# Patient Record
Sex: FEMALE | Race: WHITE | URBAN - METROPOLITAN AREA
[De-identification: names, ages, dates, MRNs, and addresses within clinical notes are randomized per-mention and may not be internally consistent; named-entity substitution may affect disease eponyms.]

---
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
## 2025-02-10 ENCOUNTER — OFFICE VISIT (OUTPATIENT)
Dept: ORTHOPEDICS | Facility: CLINIC | Age: 20
End: 2025-02-10
Payer: COMMERCIAL

## 2025-02-10 VITALS
BODY MASS INDEX: 34.55 KG/M2 | DIASTOLIC BLOOD PRESSURE: 81 MMHG | WEIGHT: 202.38 LBS | SYSTOLIC BLOOD PRESSURE: 119 MMHG | HEIGHT: 64 IN | HEART RATE: 72 BPM | OXYGEN SATURATION: 100 %

## 2025-02-10 DIAGNOSIS — M24.419 RECURRENT SUBLUXATION OF SHOULDER WITH MULTIDIRECTIONAL INSTABILITY: Primary | ICD-10-CM

## 2025-02-10 PROCEDURE — 1159F MED LIST DOCD IN RCRD: CPT | Mod: CPTII,,, | Performed by: SPECIALIST

## 2025-02-10 PROCEDURE — 99213 OFFICE O/P EST LOW 20 MIN: CPT | Mod: S$PBB,,, | Performed by: SPECIALIST

## 2025-02-10 PROCEDURE — 3008F BODY MASS INDEX DOCD: CPT | Mod: CPTII,,, | Performed by: SPECIALIST

## 2025-02-10 PROCEDURE — 3079F DIAST BP 80-89 MM HG: CPT | Mod: CPTII,,, | Performed by: SPECIALIST

## 2025-02-10 PROCEDURE — 99213 OFFICE O/P EST LOW 20 MIN: CPT | Mod: PBBFAC

## 2025-02-10 PROCEDURE — 3074F SYST BP LT 130 MM HG: CPT | Mod: CPTII,,, | Performed by: SPECIALIST

## 2025-02-10 NOTE — PROGRESS NOTES
Faculty Attestation: Andree Meredith  was seen at [facility] in the Orthopaedic Clinic. Patient seen and evaluated at the time of the visit. History of Present Illness, Physical Exam, and Assessment and Plan reviewed. Treatment plan is reasonable and appropriate. Compliance with treatment recommendations is important. No procedure was performed.  Patient is complaints and examination upon my personal history taking and clinical examination are consistent with multidirectional instability.  She has a 2 to 3+ posterior and anterior glenoid load and shift as well as a positive sulcus sign.  She has never had a jeevan dislocation by history and has never had an emergency room visit for dislocation.  She has laxity on exam with minimal apprehension of shoulder external rotation of 120° while abducted at 90°.  She has more apprehension during jerk test.  She does have a positive apprehension sign anteriorly with a positive relocation sign.  We will send her to physical therapy and have her follow up for an evaluation with Dr. Oliva.    Seven Jensen MD  Orthopaedic Surgery

## 2025-02-10 NOTE — PROGRESS NOTES
\[facility]\"" Orthopaedic Surgery Clinic Note      HPI:  19 y.o. female presents to the clinic today for evaluation of right shoulder pain and instability.  She returns today for follow-up of her MRI.  She continues to note instability feelings in the shoulder.  She has not ever had to go to the ER to have it reduced.  She says that she feels that it moves all around.  It bothers her at night.    PMH:   History reviewed. No pertinent past medical history.    PSH:    has no past surgical history on file.    SOCIAL:    reports that she has never smoked. She has never used smokeless tobacco. She reports that she does not drink alcohol and does not use drugs.    FAMILY:  Family History   Problem Relation Name Age of Onset    Depression Mother Adriana     Vision loss Mother Adriana     Diabetes Father Yan     Cancer Paternal Grandfather Dads dad     Depression Sister Gissel     Depression Maternal Aunt Genigor        MEDS:   Current Outpatient Medications on File Prior to Visit   Medication Sig Dispense Refill    diclofenac sodium (VOLTAREN ARTHRITIS PAIN) 1 % Gel Apply 2 g topically 4 (four) times daily. Do not exceed 32 grams/day: do not to exceed 8 grams/day/single joint of upper extremities; do not to exceed 16 grams/day/single joint of lower extremities.  Please request refill of this medication from your PCP. 100 g 3    sertraline (ZOLOFT) 50 MG tablet Take 1 tablet by mouth every morning.       No current facility-administered medications on file prior to visit.       ALLERGY:   Allergies as of 02/10/2025    (No Known Allergies)       Vitals:    Vitals:    02/10/25 0733   BP: 119/81   Pulse: 72       Physical Exam:    Gen: A/Ox3, NAD  Card: RR by RP  Lungs: nonlabored breathing, symmetric chest rise  Abd: S/NT/ND    Right shoulder     No open wounds or skin lesions   No tenderness directly over the AC joint, no tenderness laterally or posteriorly, very mild tenderness anteriorly   Range of motion 180/180/70/T4   Rotator cuff  strength 5/5   negative cross-body adduction  Positive apprehension and relocation test   Able to move her shoulder anteriorly and posterior to the room but unable to dislocate with shift and load  Positive jerk test   Negative speed, Yergason's   Motor intact ain/pin/U   Sensation intact to light touch/U/R   Well-perfused distally    Radiology:  Impression:     The rotator cuff and labrum are nicely visualized and appear intact     The axillary recess is patulous consistent with capsular laxity.  The middle and inferior glenohumeral ligaments are disorganized consistent with partial-thickness tearing which may not be acute.  The subscapularis muscle belly is partially stripped off of the anterior scapula.  Taken together these findings are typically associated with anterior dislocation.    Assessment/Plan:  19 y.o. female with recurrent instability of the right shoulder concerning for multidirectional instability, possible partial subscap tear    We discussed continued nonoperative versus operative treatment.  I do think she has multidirectional instability based on her exam, clinical findings, and MRI.  We will try another round of physical therapy to see if we can get her some relief from her symptoms.  We will bring her back with Dr. Oliva is here after her therapy.  If she continues to have symptoms we will discuss with him whether he thinks there is a good surgical option.          Fernando Perez MD

## 2025-05-05 ENCOUNTER — OFFICE VISIT (OUTPATIENT)
Dept: ORTHOPEDICS | Facility: CLINIC | Age: 20
End: 2025-05-05
Payer: COMMERCIAL

## 2025-05-05 VITALS
HEIGHT: 64 IN | TEMPERATURE: 98 F | BODY MASS INDEX: 34.49 KG/M2 | SYSTOLIC BLOOD PRESSURE: 132 MMHG | WEIGHT: 202 LBS | DIASTOLIC BLOOD PRESSURE: 80 MMHG

## 2025-05-05 DIAGNOSIS — S43.431S BANKART LESION OF RIGHT SHOULDER, SEQUELA: Primary | ICD-10-CM

## 2025-05-05 PROBLEM — S43.431A BANKART LESION OF RIGHT SHOULDER: Status: ACTIVE | Noted: 2025-05-05

## 2025-05-05 PROCEDURE — 99214 OFFICE O/P EST MOD 30 MIN: CPT | Mod: PBBFAC

## 2025-05-05 PROCEDURE — 1159F MED LIST DOCD IN RCRD: CPT | Mod: CPTII,,, | Performed by: ORTHOPAEDIC SURGERY

## 2025-05-05 PROCEDURE — 3075F SYST BP GE 130 - 139MM HG: CPT | Mod: CPTII,,, | Performed by: ORTHOPAEDIC SURGERY

## 2025-05-05 PROCEDURE — 3079F DIAST BP 80-89 MM HG: CPT | Mod: CPTII,,, | Performed by: ORTHOPAEDIC SURGERY

## 2025-05-05 PROCEDURE — 99214 OFFICE O/P EST MOD 30 MIN: CPT | Mod: S$PBB,GC,, | Performed by: ORTHOPAEDIC SURGERY

## 2025-05-05 PROCEDURE — 3008F BODY MASS INDEX DOCD: CPT | Mod: CPTII,,, | Performed by: ORTHOPAEDIC SURGERY

## 2025-05-05 RX ORDER — CEFAZOLIN SODIUM 2 G/50ML
2 SOLUTION INTRAVENOUS
OUTPATIENT
Start: 2025-05-05

## 2025-05-05 RX ORDER — MUPIROCIN 20 MG/G
OINTMENT TOPICAL
OUTPATIENT
Start: 2025-05-05

## 2025-05-05 NOTE — LETTER
[facility]  [address] [place] [address]  Phone: [phone]     How to Prepare for Surgery (Orthopedic)  About this topic   There are some things that are common for most kinds of surgery. These help to keep you safe. Learn what you can do to get ready for your surgery. This will help you and the team caring for you during your surgery. Also, learn about the things the doctor and nurses do to keep you safe during surgery.  You may have outpatient surgery where you come in the day of your surgery and then go home hours after your surgery.  In other cases, you may be admitted to the hospital the day before your surgery, or you may have to stay in the hospital after your surgery.  General   Weeks before your surgery:  These are things you can do to lower your chances of having problems after your surgery:  Stop smoking if you are a smoker. Avoid being around others who smoke for several weeks before and after your surgery. Smoking limits how much oxygen gets to your body for healing.  If you have diabetes, keep your blood sugars as near normal as you can. This helps lower your chance of infection or other problems after your surgery.  Talk to your doctor about all the drugs you take. This includes over-the-counter drugs, natural products, and vitamins.  There may be some you can take the day of your surgery. If you have diabetes, talk to your doctor about your drugs, especially if you are on insulin. Your doctor may tell you to take less than usual on the day of your surgery.  If you take insulin or any diabetic medication do not take the morning of surgery. __________________________________________________________________________________  Your doctor may want you to stop taking some of your drugs before surgery. Some drugs and natural products make it harder for your blood to clot. Then, you may have more bleeding during or after surgery. Be sure to tell your doctor if  you are taking any drugs that may cause bleeding.   Common Medication Perioperative Consideration Stop prior to surgery   Aspirin Risk for Bleeding 7 days   NSAID's (antiinflammatories) Examples: Mobic/Meloxicam  Advil/Motrin/Ibuprofen/Aleve  Naproixen/Naprosyn  Voltaren/Diclofenac/Arthotec  /Celebrex/Celecoxib  /Toradol/Ketorolac  Ralafen/Nabumetone/Arthotec Risk for Bleeding 7 days   Phentermine/Diet Pills  Anesthesia, BP, Cardiac Side Effects 7 days   GIP/Glp-1 Agonist  Examples: Trulicity (Dulaglutide) Ozempic/Wegovy (Semaglutide) Victoza/Saxenda (liraglutide) Mounjaro (Trizepatide) Delayed Gastric Emptying causing Increase risk of aspiration in surgery 14 days   Omega-3/Fish oil Increase risk for bleeding 7 days    Medication that needs to be stopped ____________________________________________________________________________________________________________________________________________________________________________________________  Herbal Supplements you should stop     Herbal Supplement Operative Concerns Stop before Surgery   Echinacea Can be associated with allergic reaction 7 days   Ephedra (Mahung) Increase blood pressure and heart rate with anesthesia 7 days   All Vitamins, CBD supplement, Garlic, Samreen, Tumeric,Umary Increase risk of bleeding 7 days   Gingko Biloba Increase risk of bleeding 7 days   Ginseng Low Glucose, decrease effects of coumadin 7 days   Kava Increased sedation with anesthesia  7 days   Alonzo Wort Multiple drug interaction with coumadin, steroids, and some heart medication 7 days   Valerian Increase sedation Taper dose  14 days before surgery   Phentermine Cardiac side effects 7 days   Fenfluramine (Phen Phen) Cardiac side effects 7 days      Have the tests done that your doctor orders before your surgery. Your doctor may want you to have lab tests, an x-ray, or EKG to see how healthy you are. Having these tests helps the doctor plan for your care during surgery.  Mild  exercise like walking, riding a bike, or swimming may be good for you. It may help you breathe more easily before and after surgery. Ask your doctor if it is OK for you to exercise before surgery. Also, practice taking deep breaths. Often after surgery, you will be asked to take deep breaths and cough. This may help prevent lung infections.  Follow a healthy diet and eat nutritious, well-balanced meals. Eat a healthy meal for supper the day before your surgery. Avoid beer, wine, and mixed drinks (alcohol).  You will not be allowed to drive right away after surgery. Ask a family member or a friend to drive you home.  Most often your doctor will want you to have an adult stay with you for at least 24 hours. Arrange with family or friends to stay with you on the first day after your surgery or when you go home.  If you get sick with a cold, infection, or other illness in the 2 weeks before your surgery, call your doctor's office. You may need to change when you have surgery because you may be more at risk for infection or other problems.  Check your skin for rashes, cuts, insect bites, or any skin infections and report these to your doctor before surgery. Pay attention to the areas that you cannot see easily, such as the bottoms of your feet and back. Check in skin folds for any bumps or skin rashes.  Shaving should be stopped at least 2 days before surgery on all areas of the body including the face, legs, underarms, etc.   Day before and morning of your surgery:  Wash your hair and take a bath or shower before your surgery (around 7 pm). The doctor may give you special soap or wipes to wash with before the surgery to lessen the germs on your skin. Follow the directions how to use this special soap or wipes provided by your doctor. Your skin should be completely dry and cool. When applied to sensitive skin, the cloths may irritate the skin such as temporary itching sensation and /or redness.  If itching or redness  persists, rinse affected area and discontinue use.  Clean skin in the following order using one cloth for each step.   AVOID CONTACT WITH EYES, EARS, MOUTH,AND MUCOUS MEMBRANES (VAGINAL OR RECTAL).                     YOU MUST USE ALL OF THE CLOTHS  Cloth #1  Wipe YOUR Neck and chest.  Cloth #2  Wipe both arms to fingertips and both armpits.  Cloth #3  Wipe both hips followed by groin area. Be sure to wipe the folds of your stomach and groin.  DO NOT use on vaginal and rectal areas.  Cloth #4  Wipe both legs starting at the thigh and ending at the toes.  Cloth #5  Wipe your back.  Cloth #6  Wipe your buttocks.  Do not use body lotions, perfumes, powders, or creams on your skin, especially near the surgical site. Do not wear makeup, especially eye makeup.  You will also need to take off nail polish and jewelry. Remove any jewelry from body piercings.  Stop eating and drinking at the time you are told to. This helps to make sure that your stomach is empty while you are under anesthesia.  Brush your teeth before your surgery. Take extra care not to drink any water while you brush. If your child is having surgery, watch that your child does not drink any water while they brush.  Leave all valuables and jewelry at home.  What to expect when you arrive for surgery:  At the hospital or surgery center, the staff will work to get you ready for your surgery. Here are some of the things that will happen before you get to the operating room:  You will have a bracelet that has your name, birth date, and other information on it. Staff may check this bracelet often or ask you your name and birthdate. This is a safety check to make sure they have the right patient.  If you have allergies, you may have to wear a bracelet with them listed on it. You may also have a bracelet to tell staff that you are at a higher risk of falling.  If you have a history of sleep apnea and use a CPAP machine for sleep, please bring the CPAP with you if  you are spending the night after your surgery.  You will change out of your clothing and wear a hospital gown. You will need to take off your glasses and remove contact lenses, hearing aids, and dentures before your surgery.  The staff will:  Give you warm blankets or use a warming blanket so you are not cold.  Take your temperature and blood pressure. They may also ask about or check your height and weight.  Ask questions about your health history and allergies. You may have to tell this information to others as well. This is another safety check.  Put an IV in your hand or arm to give you fluids and drugs. You may be given a drug to make you sleepy.  The staff may:  Give you a special mouthwash to use. This helps lower the number of germs in your mouth.  Put special stockings or boots on your legs and feet to help with blood flow.  Use clippers to remove hair around the area of your surgical cut, depending on the site of your surgery.  The anesthesia team will:  Ask about your history and allergies.  Ask you to sign a consent after they speak with you about their anesthesia plan for you.  Want to know if you have any loose teeth before your surgery.  Talk with you about your surgery and what they will do to keep you comfortable during surgery.  Have you meet people who will be in the operating room with you.  Your doctor will:  Talk with you about your surgery and the risks and benefits. Be sure to ask any questions you may have. You may need to sign a consent form if you have not already done so.  Talk with you about the possible need for blood products. You may be asked to sign a consent for blood products as well.  Sign or ben the part of your body where you will be having surgery. This is a safety check.    What will the results be?   You will be ready for your surgery.  What drugs may be needed?   The doctor may order drugs before your surgery to:  Help with fear or worry and help you to relax  Prevent  infection  Prevent blood clots  The doctor may order drugs after surgery to:  Help lessen pain  Help prevent or lessen upset stomach  Prevent infection  When do I need to call the doctor?   Signs of infection. These include a fever of 100.4°F (38°C) or higher, chills.  If you have a cough, cold, fever, or become ill a few days before you are scheduled to have surgery. Your doctor may want to reschedule it.  If you do not have a ride to and from your surgery  If you have any skin rashes, cuts, boils, or infections on your skin. Your doctor may want to reschedule the surgery since this can put you at risk for wound infection after your surgery.  Helpful tips   Wear loose clothing and comfortable shoes that are easy to put on and take off.  Remove all body piercings before you come for your surgery.  Bring these things with you to the hospital:  Your insurance card and photo ID  A copy of your advance directive if you have one  A list of all drugs that you take and the amounts that you take  A list of all your allergies and the kind of reaction they cause you  Make sure you will be able to move about your home easily after your surgery. You may need extra pillows or a recliner to rest in.  Have foods in your home that will be easy on your belly after surgery. You may want things like juices, crackers, soups, and Jello.

## 2025-05-05 NOTE — LETTER
May 5, 2025      [facility]  [address] [place] [id number]  Phone: [phone]       Patient: Andree Meredith   YOB: 2005  Date of Visit: 05/05/2025    To Whom It May Concern:    Brandi Meredith  was at [facility] on 05/05/2025. The patient may return to work/school on 05/06/2025 with no restrictions. If you have any questions or concerns, or if I can be of further assistance, please do not hesitate to contact me.    Sincerely,    Maryanne Hermosillo MA

## 2025-05-05 NOTE — PROGRESS NOTES
[facility] Orthopaedic Surgery Clinic Note      HPI:  19 y.o. female with chronic right anterior shoulder instability which she states has not gotten better despite multiple rounds of therapy and activity modification.  She continues to work as a student .  She plans to begin college in the fall.  She is interested in surgical intervention for this problem.    PMH:   History reviewed. No pertinent past medical history.    PSH:    has no past surgical history on file.    SOCIAL:    reports that she has never smoked. She has never used smokeless tobacco. She reports that she does not drink alcohol and does not use drugs.    FAMILY:  Family History   Problem Relation Name Age of Onset    Depression Mother Adriana     Vision loss Mother Adriana     Diabetes Father Yan     Cancer Paternal Grandfather Dads dad     Depression Sister Gissel     Depression Maternal Aunt igor        MEDS:   Current Outpatient Medications on File Prior to Visit   Medication Sig Dispense Refill    diclofenac sodium (VOLTAREN ARTHRITIS PAIN) 1 % Gel Apply 2 g topically 4 (four) times daily. Do not exceed 32 grams/day: do not to exceed 8 grams/day/single joint of upper extremities; do not to exceed 16 grams/day/single joint of lower extremities.  Please request refill of this medication from your PCP. 100 g 3    sertraline (ZOLOFT) 50 MG tablet Take 1 tablet by mouth every morning.       No current facility-administered medications on file prior to visit.       ALLERGY:   Allergies as of 05/05/2025    (No Known Allergies)       Vitals:    Vitals:    05/05/25 1304   BP: 132/80   Temp: 97.6 °F (36.4 °C)       Physical Exam:    Gen: A/Ox3, NAD  Card: RR by RP      Right shoulder     No open wounds or skin lesions   No tenderness directly over the AC joint, no tenderness laterally or posteriorly, very mild tenderness anteriorly   Range of motion 180/180/70/T4   Rotator cuff strength 5/5   negative cross-body adduction  Positive apprehension  and relocation test   Negative speed, Yergason's   Motor intact ain/pin/U   Sensation intact to light touch/U/R   Well-perfused distally    Imaging:  MRI right shoulder demonstrates middle and inferior glenohumeral ligament incompetence, partial anterior labral sleeve avulsion, partial undersurface subscapularis tear    Assessment/Plan:  19 y.o. female with recurrent instability of the right shoulder concerning for multidirectional instability, possible partial subscap tear    -discussed the r/b/a to right shoulder arthroscopic labral repair, possible rotator cuff repair, debridement; we particularly emphasized that the posterior instability she occasionally experiences would not improve with this type of surgery although the majority of her symptoms she expressed our anterior, she would like to move forward with surgery and expressed verbal understanding of all this, informed consent was obtained  -case request an orders placed for 05/27/2025          Caleb Turner MD

## 2025-05-06 NOTE — PROGRESS NOTES
Faculty Attestation: Andree Meredith  was seen at [facility] in the Orthopaedic Clinic in the outpatient department at a [facility]. Patient seen and evaluated at the time of the visit.  I participated in the management of the patient and was immediately available throughout the encounter. History of Present Illness, Physical Exam, and Assessment and Plan reviewed. Treatment plan is reasonable and appropriate. Compliance with treatment recommendations is important. No procedure was performed.   By my exam I was able to appreciate some posterior subluxation. No sulcus sign and anterior apprehension.    Haresh Flowers MD  Orthopedic Surgery Chief

## 2025-05-26 ENCOUNTER — ANESTHESIA EVENT (OUTPATIENT)
Dept: SURGERY | Facility: HOSPITAL | Age: 20
End: 2025-05-26
Payer: COMMERCIAL

## 2025-05-26 NOTE — ANESTHESIA PREPROCEDURE EVALUATION
05/26/2025  Andree Meredith is a 19 y.o., female with PMHx of obesity, depression presents for Rt shoulder ATS.    NO BETA BLOCKER USE    Active Ambulatory Problems     Diagnosis Date Noted    Bankart lesion of right shoulder 05/05/2025     Resolved Ambulatory Problems     Diagnosis Date Noted    No Resolved Ambulatory Problems     No Additional Past Medical History       Pre-op Assessment    I have reviewed the NPO Status.      Review of Systems  Anesthesia Hx:  No previous Anesthesia                Social:  Non-Smoker       Cardiovascular:  Cardiovascular Normal                                              Pulmonary:  Pulmonary Normal                       Renal/:  Renal/ Normal                 Hepatic/GI:  Hepatic/GI Normal                    Neurological:  Neurology Normal                                      Endocrine:        Obesity / BMI > 30  Psych:    depression              Vitals:    05/27/25 0515 05/27/25 0515 05/27/25 0519 05/27/25 0627   BP:  121/84  120/77   Pulse:  91  82   Resp:   20 20   Temp:  36.7 °C (98 °F)  36.3 °C (97.3 °F)   TempSrc:  Oral  Temporal   SpO2:  96%  100%   Weight: 91.8 kg (202 lb 6.4 oz)            Physical Exam  General: Alert, Cooperative and Well nourished    Airway:  Mallampati: II   Mouth Opening: Normal  TM Distance: Normal  Tongue: Normal  Neck ROM: Normal ROM    Dental:  Intact    Chest/Lungs:  Normal Respiratory Rate, Clear to auscultation    Heart:  Rate: Normal  Rhythm: Regular Rhythm  Sounds: Normal      Lab Results   Component Value Date    WBC 6.9 11/10/2020    HGB 14.2 11/10/2020    HCT 41.9 11/10/2020    MCV 97.2 (H) 11/10/2020     11/10/2020       CMP  Sodium   Date Value Ref Range Status   11/10/2020 142 133 - 143 mmol/L Final     Potassium   Date Value Ref Range Status   11/10/2020 4.4 3.5 - 5.1 mmol/L Final     Chloride   Date Value Ref Range  Status   11/10/2020 104 98 - 115 mmol/L Final     CO2   Date Value Ref Range Status   11/10/2020 30.1 (H) 17.0 - 30.0 mmol/L Final     Glucose   Date Value Ref Range Status   11/10/2020 88 74 - 106 mg/dL Final     Blood Urea Nitrogen   Date Value Ref Range Status   11/10/2020 13.6 7.0 - 22.0 mg/dL Final     Creatinine   Date Value Ref Range Status   11/10/2020 0.59 0.30 - 1.30 mg/dL Final     Calcium   Date Value Ref Range Status   11/10/2020 9.7 8.5 - 10.1 mg/dL Final     Protein Total   Date Value Ref Range Status   11/10/2020 7.7 6.1 - 8.0 gm/dL Final     Albumin   Date Value Ref Range Status   11/10/2020 4.23 3.10 - 4.80 gm/dL Final     Bilirubin Total   Date Value Ref Range Status   11/10/2020 0.5 0.0 - 1.9 mg/dL Final     ALP   Date Value Ref Range Status   11/10/2020 118 (H) 46 - 116 unit/L Final     AST   Date Value Ref Range Status   11/10/2020 18 14 - 37 unit/L Final     ALT   Date Value Ref Range Status   11/10/2020 15 12 - 78 unit/L Final      Latest Reference Range & Units 05/27/25 05:26   hCG Qualitative, Urine Negative  Negative       Anesthesia Plan  Type of Anesthesia, risks & benefits discussed:    Anesthesia Type: Gen ETT, Regional  Intra-op Monitoring Plan: Standard ASA Monitors  Post Op Pain Control Plan: IV/PO Opioids PRN  Induction:  IV  Airway Plan: Direct  Informed Consent: Informed consent signed with the Patient and all parties understand the risks and agree with anesthesia plan.  All questions answered.   ASA Score: 2  Day of Surgery Review of History & Physical: H&P Update referred to the surgeon/provider.    Ready For Surgery From Anesthesia Perspective.     .

## 2025-05-27 ENCOUNTER — HOSPITAL ENCOUNTER (OUTPATIENT)
Facility: HOSPITAL | Age: 20
Discharge: HOME | End: 2025-05-27
Attending: ORTHOPAEDIC SURGERY | Admitting: ORTHOPAEDIC SURGERY
Payer: COMMERCIAL

## 2025-05-27 ENCOUNTER — ANESTHESIA (OUTPATIENT)
Dept: SURGERY | Facility: HOSPITAL | Age: 20
End: 2025-05-27
Payer: COMMERCIAL

## 2025-05-27 DIAGNOSIS — S43.431S BANKART LESION OF RIGHT SHOULDER, SEQUELA: ICD-10-CM

## 2025-05-27 LAB
B-HCG UR QL: NEGATIVE
CTP QC/QA: YES

## 2025-05-27 PROCEDURE — 63600175 PHARM REV CODE 636 W HCPCS: Performed by: ORTHOPAEDIC SURGERY

## 2025-05-27 PROCEDURE — 37000009 HC ANESTHESIA EA ADD 15 MINS: Performed by: ORTHOPAEDIC SURGERY

## 2025-05-27 PROCEDURE — 27201423 OPTIME MED/SURG SUP & DEVICES STERILE SUPPLY: Performed by: ORTHOPAEDIC SURGERY

## 2025-05-27 PROCEDURE — 81025 URINE PREGNANCY TEST: CPT | Performed by: SPECIALIST

## 2025-05-27 PROCEDURE — 63600175 PHARM REV CODE 636 W HCPCS

## 2025-05-27 PROCEDURE — 36000710: Performed by: ORTHOPAEDIC SURGERY

## 2025-05-27 PROCEDURE — 36000711: Performed by: ORTHOPAEDIC SURGERY

## 2025-05-27 PROCEDURE — 71000015 HC POSTOP RECOV 1ST HR: Performed by: ORTHOPAEDIC SURGERY

## 2025-05-27 PROCEDURE — 37000008 HC ANESTHESIA 1ST 15 MINUTES: Performed by: ORTHOPAEDIC SURGERY

## 2025-05-27 PROCEDURE — 29806 SHO ARTHRS SRG CAPSULORRAPHY: CPT | Mod: RT,,, | Performed by: ORTHOPAEDIC SURGERY

## 2025-05-27 PROCEDURE — 63600175 PHARM REV CODE 636 W HCPCS: Performed by: STUDENT IN AN ORGANIZED HEALTH CARE EDUCATION/TRAINING PROGRAM

## 2025-05-27 PROCEDURE — 25000003 PHARM REV CODE 250

## 2025-05-27 PROCEDURE — C1713 ANCHOR/SCREW BN/BN,TIS/BN: HCPCS | Performed by: ORTHOPAEDIC SURGERY

## 2025-05-27 PROCEDURE — 63600175 PHARM REV CODE 636 W HCPCS: Performed by: ANESTHESIOLOGY

## 2025-05-27 PROCEDURE — 71000033 HC RECOVERY, INTIAL HOUR: Performed by: ORTHOPAEDIC SURGERY

## 2025-05-27 RX ORDER — ESCITALOPRAM OXALATE 20 MG/1
20 TABLET ORAL DAILY
COMMUNITY

## 2025-05-27 RX ORDER — NAPROXEN 500 MG/1
500 TABLET ORAL 2 TIMES DAILY
Qty: 14 TABLET | Refills: 0 | Status: SHIPPED | OUTPATIENT
Start: 2025-05-27

## 2025-05-27 RX ORDER — KETAMINE HCL IN 0.9 % NACL 50 MG/5 ML
SYRINGE (ML) INTRAVENOUS
Status: DISCONTINUED | OUTPATIENT
Start: 2025-05-27 | End: 2025-05-27

## 2025-05-27 RX ORDER — ROCURONIUM BROMIDE 10 MG/ML
INJECTION, SOLUTION INTRAVENOUS
Status: DISCONTINUED | OUTPATIENT
Start: 2025-05-27 | End: 2025-05-27

## 2025-05-27 RX ORDER — PROPOFOL 10 MG/ML
VIAL (ML) INTRAVENOUS
Status: DISCONTINUED | OUTPATIENT
Start: 2025-05-27 | End: 2025-05-27

## 2025-05-27 RX ORDER — MEPERIDINE HYDROCHLORIDE 25 MG/ML
12.5 INJECTION INTRAMUSCULAR; INTRAVENOUS; SUBCUTANEOUS EVERY 10 MIN PRN
Status: DISCONTINUED | OUTPATIENT
Start: 2025-05-27 | End: 2025-05-27 | Stop reason: HOSPADM

## 2025-05-27 RX ORDER — MUPIROCIN 20 MG/G
OINTMENT TOPICAL
Status: DISCONTINUED | OUTPATIENT
Start: 2025-05-27 | End: 2025-05-27 | Stop reason: HOSPADM

## 2025-05-27 RX ORDER — MORPHINE SULFATE 2 MG/ML
2 INJECTION, SOLUTION INTRAMUSCULAR; INTRAVENOUS EVERY 5 MIN PRN
Status: DISCONTINUED | OUTPATIENT
Start: 2025-05-27 | End: 2025-05-27 | Stop reason: HOSPADM

## 2025-05-27 RX ORDER — GLUCAGON 1 MG
1 KIT INJECTION
Status: DISCONTINUED | OUTPATIENT
Start: 2025-05-27 | End: 2025-05-27 | Stop reason: HOSPADM

## 2025-05-27 RX ORDER — ONDANSETRON HYDROCHLORIDE 2 MG/ML
4 INJECTION, SOLUTION INTRAVENOUS DAILY PRN
Status: DISCONTINUED | OUTPATIENT
Start: 2025-05-27 | End: 2025-05-27 | Stop reason: HOSPADM

## 2025-05-27 RX ORDER — SODIUM CHLORIDE 0.9 % (FLUSH) 0.9 %
10 SYRINGE (ML) INJECTION
Status: DISCONTINUED | OUTPATIENT
Start: 2025-05-27 | End: 2025-05-27 | Stop reason: HOSPADM

## 2025-05-27 RX ORDER — MIDAZOLAM HYDROCHLORIDE 2 MG/2ML
5 INJECTION, SOLUTION INTRAMUSCULAR; INTRAVENOUS ONCE AS NEEDED
Status: COMPLETED | OUTPATIENT
Start: 2025-05-27 | End: 2025-05-27

## 2025-05-27 RX ORDER — HYDROCODONE BITARTRATE AND ACETAMINOPHEN 10; 325 MG/1; MG/1
1 TABLET ORAL EVERY 8 HOURS PRN
Qty: 28 TABLET | Refills: 0 | Status: SHIPPED | OUTPATIENT
Start: 2025-05-27

## 2025-05-27 RX ORDER — CEFAZOLIN SODIUM 1 G/3ML
2 INJECTION, POWDER, FOR SOLUTION INTRAMUSCULAR; INTRAVENOUS
Status: COMPLETED | OUTPATIENT
Start: 2025-05-27 | End: 2025-05-27

## 2025-05-27 RX ORDER — GABAPENTIN 300 MG/1
300 CAPSULE ORAL 3 TIMES DAILY
Qty: 90 CAPSULE | Refills: 0 | Status: SHIPPED | OUTPATIENT
Start: 2025-05-27 | End: 2025-06-26

## 2025-05-27 RX ORDER — SODIUM CHLORIDE, SODIUM LACTATE, POTASSIUM CHLORIDE, CALCIUM CHLORIDE 600; 310; 30; 20 MG/100ML; MG/100ML; MG/100ML; MG/100ML
INJECTION, SOLUTION INTRAVENOUS CONTINUOUS
Status: DISCONTINUED | OUTPATIENT
Start: 2025-05-27 | End: 2025-05-27 | Stop reason: HOSPADM

## 2025-05-27 RX ORDER — FENTANYL CITRATE 50 UG/ML
INJECTION, SOLUTION INTRAMUSCULAR; INTRAVENOUS
Status: DISCONTINUED | OUTPATIENT
Start: 2025-05-27 | End: 2025-05-27

## 2025-05-27 RX ORDER — OXYCODONE HYDROCHLORIDE 5 MG/1
5 TABLET ORAL
Status: DISCONTINUED | OUTPATIENT
Start: 2025-05-27 | End: 2025-05-27 | Stop reason: HOSPADM

## 2025-05-27 RX ORDER — DEXAMETHASONE SODIUM PHOSPHATE 4 MG/ML
INJECTION, SOLUTION INTRA-ARTICULAR; INTRALESIONAL; INTRAMUSCULAR; INTRAVENOUS; SOFT TISSUE
Status: DISCONTINUED | OUTPATIENT
Start: 2025-05-27 | End: 2025-05-27

## 2025-05-27 RX ORDER — LIDOCAINE HYDROCHLORIDE 20 MG/ML
INJECTION, SOLUTION EPIDURAL; INFILTRATION; INTRACAUDAL; PERINEURAL
Status: DISCONTINUED | OUTPATIENT
Start: 2025-05-27 | End: 2025-05-27

## 2025-05-27 RX ORDER — CYCLOBENZAPRINE HCL 10 MG
10 TABLET ORAL 3 TIMES DAILY PRN
Qty: 30 TABLET | Refills: 0 | Status: SHIPPED | OUTPATIENT
Start: 2025-05-27 | End: 2025-06-06

## 2025-05-27 RX ORDER — KETOROLAC TROMETHAMINE 30 MG/ML
INJECTION, SOLUTION INTRAMUSCULAR; INTRAVENOUS
Status: DISCONTINUED | OUTPATIENT
Start: 2025-05-27 | End: 2025-05-27

## 2025-05-27 RX ORDER — ONDANSETRON HYDROCHLORIDE 2 MG/ML
INJECTION, SOLUTION INTRAMUSCULAR; INTRAVENOUS
Status: DISCONTINUED | OUTPATIENT
Start: 2025-05-27 | End: 2025-05-27

## 2025-05-27 RX ORDER — EPINEPHRINE 1 MG/ML
INJECTION, SOLUTION, CONCENTRATE INTRAVENOUS
Status: DISCONTINUED | OUTPATIENT
Start: 2025-05-27 | End: 2025-05-27 | Stop reason: HOSPADM

## 2025-05-27 RX ADMIN — Medication 10 MG: at 08:05

## 2025-05-27 RX ADMIN — PROPOFOL 200 MG: 10 INJECTION, EMULSION INTRAVENOUS at 07:05

## 2025-05-27 RX ADMIN — ONDANSETRON 4 MG: 2 INJECTION INTRAMUSCULAR; INTRAVENOUS at 10:05

## 2025-05-27 RX ADMIN — ONDANSETRON 4 MG: 2 INJECTION INTRAMUSCULAR; INTRAVENOUS at 09:05

## 2025-05-27 RX ADMIN — MIDAZOLAM HYDROCHLORIDE 4 MG: 1 INJECTION, SOLUTION INTRAMUSCULAR; INTRAVENOUS at 06:05

## 2025-05-27 RX ADMIN — ROCURONIUM BROMIDE 40 MG: 10 INJECTION INTRAVENOUS at 07:05

## 2025-05-27 RX ADMIN — SUGAMMADEX 200 MG: 100 INJECTION, SOLUTION INTRAVENOUS at 09:05

## 2025-05-27 RX ADMIN — Medication 10 MG: at 09:05

## 2025-05-27 RX ADMIN — SODIUM CHLORIDE, POTASSIUM CHLORIDE, SODIUM LACTATE AND CALCIUM CHLORIDE: 600; 310; 30; 20 INJECTION, SOLUTION INTRAVENOUS at 07:05

## 2025-05-27 RX ADMIN — Medication 30 MG: at 07:05

## 2025-05-27 RX ADMIN — CEFAZOLIN 2 G: 330 INJECTION, POWDER, FOR SOLUTION INTRAMUSCULAR; INTRAVENOUS at 07:05

## 2025-05-27 RX ADMIN — SODIUM CHLORIDE, POTASSIUM CHLORIDE, SODIUM LACTATE AND CALCIUM CHLORIDE: 600; 310; 30; 20 INJECTION, SOLUTION INTRAVENOUS at 09:05

## 2025-05-27 RX ADMIN — DEXAMETHASONE SODIUM PHOSPHATE 8 MG: 4 INJECTION, SOLUTION INTRA-ARTICULAR; INTRALESIONAL; INTRAMUSCULAR; INTRAVENOUS; SOFT TISSUE at 07:05

## 2025-05-27 RX ADMIN — FENTANYL CITRATE 50 MCG: 50 INJECTION, SOLUTION INTRAMUSCULAR; INTRAVENOUS at 07:05

## 2025-05-27 RX ADMIN — FENTANYL CITRATE 25 MCG: 50 INJECTION, SOLUTION INTRAMUSCULAR; INTRAVENOUS at 09:05

## 2025-05-27 RX ADMIN — SODIUM CHLORIDE, POTASSIUM CHLORIDE, SODIUM LACTATE AND CALCIUM CHLORIDE: 600; 310; 30; 20 INJECTION, SOLUTION INTRAVENOUS at 06:05

## 2025-05-27 RX ADMIN — LIDOCAINE HYDROCHLORIDE 100 MG: 20 INJECTION, SOLUTION EPIDURAL; INFILTRATION; INTRACAUDAL; PERINEURAL at 07:05

## 2025-05-27 RX ADMIN — ROCURONIUM BROMIDE 10 MG: 10 INJECTION INTRAVENOUS at 07:05

## 2025-05-27 RX ADMIN — KETOROLAC TROMETHAMINE 30 MG: 30 INJECTION INTRAMUSCULAR; INTRAVENOUS at 09:05

## 2025-05-27 NOTE — DISCHARGE INSTRUCTIONS
Maintain arm in sling until cleared in clinic  OK to take sling off at night and move the elbow gently  No lifting with the extremity or moving the shoulder until cleared  OK to remove bandages in 3 days and replace with dry gauze and tape  OK to have running water over wounds in one week    · Keep follow up appointment at [facility].    · Take pain medication as prescribed. See medication sheet.    · Keep arm elevated on pillow.    · No driving or consuming alcohol for the next 24 hours or while taking narcotic pain medicine.    · May apply ice pack to surgical area for 20 minutes at a time 6-8 times per day.    · Notify MD of any moderate to severe pain unrelieved by pain medicine or for any signs of infection including fever above 100.4, excessive redness or swelling, yellow/green foul- smelling drainage, nausea or vomiting. Call clinic at: [phone]. After business hours, if you are unable to reach a doctor on call at [phone] or your concern is an emergency, call 911 or report to your nearest emergency room.    ·  Thanks for choosing [facility]! Have a smooth recovery!

## 2025-05-27 NOTE — TRANSFER OF CARE
Anesthesia Transfer of Care Note    Patient: Andree Meredith    Procedure(s) Performed: Procedure(s) (LRB):  ARTHROSCOPY, SHOULDER, W/ BANKART REPAIR (Right)    Patient location: PACU    Anesthesia Type: general    Transport from OR: Transported from OR on room air with adequate spontaneous ventilation    Post pain: adequate analgesia    Post assessment: no apparent anesthetic complications    Post vital signs: stable    Level of consciousness: sedated    Nausea/Vomiting: no nausea/vomiting    Complications: none    Transfer of care protocol was followed      Last vitals: Visit Vitals  /79   Pulse 105   Temp 36.3 °C (97.4 °F) (Temporal)   Resp 15   Wt 91.8 kg (202 lb 6.4 oz)   LMP 05/06/2025 (Exact Date)   SpO2 99%   Breastfeeding No   BMI 34.74 kg/m²

## 2025-05-27 NOTE — ANESTHESIA PROCEDURE NOTES
Intubation    Date/Time: 5/27/2025 7:14 AM    Performed by: Keyla Moran CRNA  Authorized by: Anuja Jay MD    Intubation:     Induction:  Intravenous    Intubated:  Postinduction    Mask Ventilation:  Easy mask    Attempts:  1    Attempted By:  CRNA    Method of Intubation:  Direct    Blade:  Mathews 2    Laryngeal View Grade: Grade I - full view of cords      Difficult Airway Encountered?: No      Complications:  None    Airway Device:  Oral endotracheal tube    Airway Device Size:  7.5    Style/Cuff Inflation:  Cuffed (inflated to minimal occlusive pressure)    Tube secured:  21    Secured at:  The lips    Placement Verified By:  Capnometry    Complicating Factors:  None    Findings Post-Intubation:  BS equal bilateral and atraumatic/condition of teeth unchanged

## 2025-05-27 NOTE — OP NOTE
Orthopedic Surgery Operative Note     Date of Service: 05/27/25     Pre-Operative Diagnosis: Right Shoulder Instability    Post-Operative Diagnosis: Same     Procedure(s):   1. Right shoulder arthroscopy with labral repair and capsular shift     Anesthesia: General     Surgeon:   Dr. Lionel MD, was present and scrubbed for the key portions of the procedure.     Assistant(s):   Jeferson Stuart MD    Indications   Patient is a 19F with right shoulder instability. The patient was checked again in the Holding Room. The risks, benefits, complications, treatment options, and expected outcomes were reviewed again with the patient. The patient has elected to proceed with right shoulder arthroscopy and labral repair with capsular shift. The risks and potential complications include but are not limited to infection, nerve injury, vascular injury, persistent pain, potential skin necrosis, deep vein thrombosis, possible pulmonary embolus, complications of the anesthetics and failure of the implants with potential need for future surgery to remove or revise. The patient concurred with the proposed plan, giving informed consent. The site of surgery was identified by the patient and properly noted/marked by me.     Implant:   1. 2.9 mm peek anchors x2     Procedure Details:   The patient was taken to the operating room. A Time-Out was held and the patient, procedure and laterality were verified and agreed upon by all members of the operating room staff. Prophylactic antibiotics were given.The patient was given general anesthesia.  Patient was placed in the lateral position and all bony prominences and areas of nerve passage were padded.  The right upper extremity was sterilized with alcohol followed by hydrogen peroxide followed by chlorhexidine solution and then draped in standard fashion.    We began by establishing our posterior portal directly into the glenohumeral joint.  We placed our trocar into the joint followed by our  camera and then performed our diagnostic arthroscopy.  We began by inspecting the biceps tendon which appeared to be healthy and intact.  We then moved and inspected the superior labrum which had some mild fraying however as we made our way down anteriorly to the middle labrum there was a clear tear at the upper to middle anterior labral junction.  The inferior and posterior labrum appeared to be healthy and intact.  The axillary pouch appeared patulous.  Patient had most of her instability anteriorly however there was a degree of mild posterior and inferior instability.  We then established our anterior portal and used our probe to probe the biceps tendon followed by the labrum and then completed our diagnostic arthroscopy inspecting the rotator cuff superiorly which appeared to be intact.  There were no chondral lesions seen.  Next we established a 2nd anterior portal slightly more superior for the placement of our guide for our bone anchors.  We placed our guide in the correct position and then drilled down for a 1st peek anchor which was placed without complication at the anterior edge of the glenoid.  We then shuttled 1 suture limb through both the labrum as well as the anterior joint capsule and placed a sliding knot down through our portal in order to tighten and bring up the capsule and labrum at the anterior portion of the glenoid.  We repeated the steps with a an additional anchor superiorly for complication.  With this repair we are able to resolve a considerable amount of anterior instability.    Next we placed our trocar subacromially through our posterior portal and established an anterior subacromial portal where we inspected the subacromial space where we noted a significant amount of bursitis which we cleaned out with our shaver and our electrocautery through a newly established lateral portal.  We did not note any subacromial bony impingement nor any rotator cuff tear.  Once we cleaned out the  bursa and any other frayed or inflamed looking tissues we drained the shoulder of her normal saline and performed a repair of all of our small incisions with 4-0 Vicryl Rapide suture.  We then placed Xeroform over all of our incisions followed by 4 x 4 gauze abdominal pads in the Medipore tape.  The patient was placed in a gunslinger abduction sling without complication.    Findings:   Anterior labral tear, patulous capsule and axillary recess     Estimated blood loss: 10cc     Drains: None     Total IV fluids: Per anesthesia records     Specimens: None     Complications: None, pt tolerated the procedure well     Disposition: Awakened from anesthesia, and taken to the recovery room in a stable condition, having suffered no apparent untoward event     Condition: doing well without problems     Post-Operative Management   No lifting with right shoulder  Avoid any shoulder flexion abduction and especially no rotation  May take down dressings in 3-4 days and replace with dry gauze and tape  Maintain abduction sling at all times    Jeferson Stuart MD  [employer] Orthopedic Surgery PGY-3

## 2025-05-27 NOTE — H&P
Orthopedic H&P Note    Patient is 19F indicated for RIGHT shoulder ARTS with possible bankart and possible rotator cuff repair..     I have seen and examined the patient and there are no changes since her last clinic visit.    Patient is marked consented and all questions answered.    Jeferson Stuart MD  [facility] Orthopedic Surgery PGY-3    ____________________________________________________    [facility] Orthopaedic Surgery Clinic Note        HPI:  19 y.o. female with chronic right anterior shoulder instability which she states has not gotten better despite multiple rounds of therapy and activity modification.  She continues to work as a student .  She plans to begin college in the fall.  She is interested in surgical intervention for this problem.     PMH:   History reviewed. No pertinent past medical history.     PSH:    has no past surgical history on file.     SOCIAL:    reports that she has never smoked. She has never used smokeless tobacco. She reports that she does not drink alcohol and does not use drugs.     FAMILY:         Family History   Problem Relation Name Age of Onset    Depression Mother Adriana      Vision loss Mother Adriana      Diabetes Father Yan      Cancer Paternal Grandfather Dads dad      Depression Sister Gissel      Depression Maternal Aunt Genigor           MEDS:   Medications Ordered Prior to Encounter          Current Outpatient Medications on File Prior to Visit   Medication Sig Dispense Refill    diclofenac sodium (VOLTAREN ARTHRITIS PAIN) 1 % Gel Apply 2 g topically 4 (four) times daily. Do not exceed 32 grams/day: do not to exceed 8 grams/day/single joint of upper extremities; do not to exceed 16 grams/day/single joint of lower extremities.  Please request refill of this medication from your PCP. 100 g 3    sertraline (ZOLOFT) 50 MG tablet Take 1 tablet by mouth every morning.          No current facility-administered medications on file prior to visit.            ALLERGY:        Allergies as of 05/05/2025    (No Known Allergies)         Vitals:         Vitals:     05/05/25 1304   BP: 132/80   Temp: 97.6 °F (36.4 °C)         Physical Exam:     Gen: A/Ox3, NAD  Card: RR by RP        Right shoulder      No open wounds or skin lesions   No tenderness directly over the AC joint, no tenderness laterally or posteriorly, very mild tenderness anteriorly   Range of motion 180/180/70/T4   Rotator cuff strength 5/5   negative cross-body adduction  Positive apprehension and relocation test   Negative speed, Yergason's   Motor intact ain/pin/U   Sensation intact to light touch/U/R   Well-perfused distally     Imaging:  MRI right shoulder demonstrates middle and inferior glenohumeral ligament incompetence, partial anterior labral sleeve avulsion, partial undersurface subscapularis tear     Assessment/Plan:  19 y.o. female with recurrent instability of the right shoulder concerning for multidirectional instability, possible partial subscap tear     -discussed the r/b/a to right shoulder arthroscopic labral repair, possible rotator cuff repair, debridement; we particularly emphasized that the posterior instability she occasionally experiences would not improve with this type of surgery although the majority of her symptoms she expressed our anterior, she would like to move forward with surgery and expressed verbal understanding of all this, informed consent was obtained  -case request an orders placed for 05/27/2025       Caleb Turner MD

## 2025-05-27 NOTE — DISCHARGE SUMMARY
[facility] - Periop Services  Discharge Note  Short Stay    Procedure(s) (LRB):  ARTHROSCOPY, SHOULDER, W/ BANKART REPAIR (Right)      OUTCOME: Patient tolerated treatment/procedure well without complication and is now ready for discharge.    DISPOSITION: Home or Self Care    FINAL DIAGNOSIS:  Right Shoulder Instability    FOLLOWUP: In clinic    DISCHARGE INSTRUCTIONS:    Discharge Procedure Orders   Lifting restrictions     Change dressing (specify)   Order Comments: See instructions        TIME SPENT ON DISCHARGE: 10 minutes    Jeferson Stuart MD  [employer] Orthopedic Surgery PGY-3

## 2025-05-29 VITALS
WEIGHT: 202.38 LBS | TEMPERATURE: 97 F | HEART RATE: 97 BPM | SYSTOLIC BLOOD PRESSURE: 131 MMHG | DIASTOLIC BLOOD PRESSURE: 95 MMHG | RESPIRATION RATE: 18 BRPM | BODY MASS INDEX: 34.74 KG/M2 | OXYGEN SATURATION: 99 %

## 2025-06-09 ENCOUNTER — OFFICE VISIT (OUTPATIENT)
Dept: ORTHOPEDICS | Facility: CLINIC | Age: 20
End: 2025-06-09
Payer: COMMERCIAL

## 2025-06-09 VITALS
WEIGHT: 209 LBS | TEMPERATURE: 98 F | DIASTOLIC BLOOD PRESSURE: 82 MMHG | HEIGHT: 64 IN | SYSTOLIC BLOOD PRESSURE: 123 MMHG | HEART RATE: 82 BPM | BODY MASS INDEX: 35.68 KG/M2 | OXYGEN SATURATION: 95 % | RESPIRATION RATE: 20 BRPM

## 2025-06-09 DIAGNOSIS — S43.431S BANKART LESION OF RIGHT SHOULDER, SEQUELA: Primary | ICD-10-CM

## 2025-06-09 PROCEDURE — 1159F MED LIST DOCD IN RCRD: CPT | Mod: CPTII,,, | Performed by: SPECIALIST

## 2025-06-09 PROCEDURE — 3079F DIAST BP 80-89 MM HG: CPT | Mod: CPTII,,, | Performed by: SPECIALIST

## 2025-06-09 PROCEDURE — 3074F SYST BP LT 130 MM HG: CPT | Mod: CPTII,,, | Performed by: SPECIALIST

## 2025-06-09 PROCEDURE — 99024 POSTOP FOLLOW-UP VISIT: CPT | Mod: ,,, | Performed by: SPECIALIST

## 2025-06-09 PROCEDURE — 99213 OFFICE O/P EST LOW 20 MIN: CPT | Mod: PBBFAC

## 2025-06-09 NOTE — PROGRESS NOTES
[facility] Orthopaedic Surgery Clinic Note    Orthopedic Issues:  Chronic right shoulder instability    Surgeries:  Right shoulder arthroscopic Bankart repair-05/27/2025      S:  Patient returns today for routine postoperative she issues, she has been compliant with activity restrictions were abduction sling.  She denies wound issues or drainage.  She is doing well today, she states she has minimal pain.  Denies recurrent interval instability episodes.    Accompanied by her mother  Plans to continue with her athletic training in college    PMH:   Past Medical History:   Diagnosis Date    Depression     Rotator cuff injury     right       PSH:    has a past surgical history that includes Shoulder arthroscopy w/ Bankhart procedure (Right, 5/27/2025).    SOCIAL:    reports that she has never smoked. She has never used smokeless tobacco. She reports that she does not drink alcohol and does not use drugs.    FAMILY:  Family History   Problem Relation Name Age of Onset    Depression Mother Adriana     Vision loss Mother Adriana     Diabetes Father Yan     Cancer Paternal Grandfather Dads dad     Depression Sister Gissel     Depression Maternal Aunt Genigor        MEDS:   Current Outpatient Medications on File Prior to Visit   Medication Sig Dispense Refill    EScitalopram oxalate (LEXAPRO) 20 MG tablet Take 20 mg by mouth once daily.      sertraline (ZOLOFT) 50 MG tablet Take 1 tablet by mouth every morning.      gabapentin (NEURONTIN) 300 MG capsule Take 1 capsule (300 mg total) by mouth 3 (three) times daily. (Patient not taking: Reported on 6/9/2025) 90 capsule 0    HYDROcodone-acetaminophen (NORCO)  mg per tablet Take 1 tablet by mouth every 8 (eight) hours as needed for Pain. (Patient not taking: Reported on 6/9/2025) 28 tablet 0    naproxen (NAPROSYN) 500 MG tablet Take 1 tablet (500 mg total) by mouth 2 (two) times daily. (Patient not taking: Reported on 6/9/2025) 14 tablet 0     No current facility-administered  medications on file prior to visit.       ALLERGY:   Allergies as of 06/09/2025    (No Known Allergies)       Vitals:    Vitals:    06/09/25 0855   BP: 123/82   Pulse: 82   Resp: 20   Temp: 98.2 °F (36.8 °C)       Physical Exam:    Gen: A/Ox3, NAD  Card: RR by RP      Right shoulder   Minimal tenderness to palpation about the shoulder  Portals healing appropriately without complication  Full elbow range of motion   Shoulder range of motion deferred today on exam  Motor intact ain/pin/U   Sensation intact to light touch/U/R   Well-perfused distally    Imaging:  No new imaging    Assessment/Plan:  19 y.o. female with recurrent instability of the right shoulder concerning for multidirectional instability, possible partial subscap tear      -portals closed with 4-0 vicryl rapide, instructed patient these will fall out over next couple of weeks  -return to clinic in 4 weeks for reevaluation  -no forced external rotation or abduction until at least 6 weeks postop, will reevaluate next visit and consider PT for shoulder ROM and rotator cuff strengthening

## 2025-06-09 NOTE — PROGRESS NOTES
Faculty Attestation: Andree Meredith  was seen at [facility] in the Orthopaedic Clinic. Patient seen and evaluated at the time of the visit. History of Present Illness, Physical Exam, and Assessment and Plan reviewed. Treatment plan is reasonable and appropriate. Compliance with treatment recommendations is important. No procedure was performed.     Seven Jensen MD  Orthopaedic Surgery

## 2025-06-18 NOTE — ANESTHESIA POSTPROCEDURE EVALUATION
Anesthesia Post Evaluation    Patient: Andree Meredith    Procedure(s) Performed: Procedure(s) (LRB):  ARTHROSCOPY, SHOULDER, W/ BANKART REPAIR (Right)    Final Anesthesia Type: general      Patient location during evaluation: DOSC  Post-procedure vital signs: reviewed and stable  Airway patency: patent      Anesthetic complications: no      Cardiovascular status: hemodynamically stable  Respiratory status: spontaneous ventilation  Follow-up not needed.              Vitals Value Taken Time   /95 05/27/25 11:00   Temp 36.2 °C (97.2 °F) 05/27/25 10:30   Pulse 97 05/27/25 11:00   Resp 18 05/27/25 10:30   SpO2 99 % 05/27/25 11:00         Event Time   Out of Recovery 10:25:00         Pain/Alvaro Score: No data recorded

## 2025-07-09 ENCOUNTER — OFFICE VISIT (OUTPATIENT)
Dept: ORTHOPEDICS | Facility: CLINIC | Age: 20
End: 2025-07-09
Payer: COMMERCIAL

## 2025-07-09 VITALS
WEIGHT: 212.31 LBS | SYSTOLIC BLOOD PRESSURE: 107 MMHG | HEART RATE: 69 BPM | TEMPERATURE: 98 F | HEIGHT: 64 IN | DIASTOLIC BLOOD PRESSURE: 75 MMHG | BODY MASS INDEX: 36.25 KG/M2

## 2025-07-09 DIAGNOSIS — S43.431S BANKART LESION OF RIGHT SHOULDER, SEQUELA: Primary | ICD-10-CM

## 2025-07-09 PROCEDURE — 99213 OFFICE O/P EST LOW 20 MIN: CPT | Mod: PBBFAC

## 2025-07-09 RX ORDER — BUSPIRONE HYDROCHLORIDE 10 MG/1
10 TABLET ORAL 2 TIMES DAILY
COMMUNITY
Start: 2025-06-16

## 2025-07-09 NOTE — PROGRESS NOTES
[facility] Orthopaedic Surgery Clinic Note    Orthopedic Issues:  Chronic right shoulder instability    Surgeries:  Right shoulder arthroscopic Bankart repair-05/27/2025      S:  Patient is approximately 6 weeks out from surgery.  She is overall doing very well.  She has been working on home stretches.  She has not started therapy yet.  She has no signs of local or systemic infection.  She has no pain    PMH:   Past Medical History:   Diagnosis Date    Depression     Rotator cuff injury     right       PSH:    has a past surgical history that includes Shoulder arthroscopy w/ Bankhart procedure (Right, 5/27/2025).    SOCIAL:    reports that she has never smoked. She has never used smokeless tobacco. She reports that she does not drink alcohol and does not use drugs.    FAMILY:  Family History   Problem Relation Name Age of Onset    Depression Mother Adriana     Vision loss Mother Adriana     Diabetes Father Yan     Cancer Paternal Grandfather Dads dad     Depression Sister Gissel     Depression Maternal Aunt Genigor        MEDS:   Current Outpatient Medications on File Prior to Visit   Medication Sig Dispense Refill    busPIRone (BUSPAR) 10 MG tablet Take 10 mg by mouth 2 (two) times daily.      EScitalopram oxalate (LEXAPRO) 20 MG tablet Take 20 mg by mouth once daily.      naproxen (NAPROSYN) 500 MG tablet Take 1 tablet (500 mg total) by mouth 2 (two) times daily. 14 tablet 0    gabapentin (NEURONTIN) 300 MG capsule Take 1 capsule (300 mg total) by mouth 3 (three) times daily. (Patient not taking: Reported on 6/9/2025) 90 capsule 0    HYDROcodone-acetaminophen (NORCO)  mg per tablet Take 1 tablet by mouth every 8 (eight) hours as needed for Pain. (Patient not taking: Reported on 7/9/2025) 28 tablet 0    sertraline (ZOLOFT) 50 MG tablet Take 1 tablet by mouth every morning.       No current facility-administered medications on file prior to visit.       ALLERGY:   Allergies as of 07/09/2025    (No Known Allergies)        Vitals:    Vitals:    07/09/25 1118   BP: 107/75   Pulse: 69   Temp: 98.1 °F (36.7 °C)       Physical Exam:    Gen: A/Ox3, NAD  Card: RR by RP      Right shoulder   Portals well healed  No tenderness to palpation   Motor intact and PIN systemic issues are   [place]: M/U/R   Active range of motion:  135° forward flexion, 160° abduction, internal rotation L1, external rotation 45°  2+ radial pulse    Imaging:  No new imaging    Assessment/Plan:  19 y.o. female with recurrent instability of the right shoulder concerning for multidirectional instability, possible partial subscap tear    - Occupational therapy referral for active range of motion and rotator cuff strengthening.  No external rotation past 45°   - Follow up in 6 weeks for repeat evaluation

## 2025-07-09 NOTE — PROGRESS NOTES
Faculty Attestation: Andree Meredith  was seen at [facility] in the Orthopaedic Clinic in the outpatient department at a [facility]. Patient seen and evaluated at the time of the visit.  I participated in the management of the patient and was immediately available throughout the encounter. History of Present Illness, Physical Exam, and Assessment and Plan reviewed. Treatment plan is reasonable and appropriate. Compliance with treatment recommendations is important. No procedure was performed.     Haresh Flowers MD  Orthopedic Surgery Chief

## 2025-08-20 ENCOUNTER — OFFICE VISIT (OUTPATIENT)
Dept: ORTHOPEDICS | Facility: CLINIC | Age: 20
End: 2025-08-20
Payer: COMMERCIAL

## 2025-08-20 VITALS
SYSTOLIC BLOOD PRESSURE: 123 MMHG | HEIGHT: 64 IN | WEIGHT: 214 LBS | OXYGEN SATURATION: 98 % | TEMPERATURE: 98 F | BODY MASS INDEX: 36.54 KG/M2 | DIASTOLIC BLOOD PRESSURE: 80 MMHG

## 2025-08-20 DIAGNOSIS — M24.419 RECURRENT SUBLUXATION OF SHOULDER WITH MULTIDIRECTIONAL INSTABILITY: Primary | ICD-10-CM

## 2025-08-20 PROCEDURE — 99213 OFFICE O/P EST LOW 20 MIN: CPT | Mod: PBBFAC

## (undated) DEVICE — PENCIL SMOKE EVAC TELSCP 15FT

## (undated) DEVICE — GLOVE SENSICARE NEOPRENE 6.5

## (undated) DEVICE — GLOVE SENSICARE PI GRN 6.5

## (undated) DEVICE — NDL FLTR 5MCRN BLNT TIP 18GX1

## (undated) DEVICE — HYDROGEN PEROXIDE HDX10 3% 4OZ

## (undated) DEVICE — TUBING SUCTION STRAIGHT .25X20

## (undated) DEVICE — SUT CTD VICRYL 0 UND BR CT

## (undated) DEVICE — MANIFOLD 4 PORT

## (undated) DEVICE — SUT VICRYL RAPIDE 4-0 18 P

## (undated) DEVICE — SOCKINETTE IMPERVIOUS 12X48IN

## (undated) DEVICE — NDL ANES SPINAL 18X3.5ST 18G

## (undated) DEVICE — APPLICATOR CHLORAPREP ORN 26ML

## (undated) DEVICE — COVER TABLE HVY DTY 60X90IN

## (undated) DEVICE — COVER MAYO STND XL 30X57IN

## (undated) DEVICE — GLOVE SENSICARE PI GRN 7.5

## (undated) DEVICE — DRAPE FULL SHEET 70X100IN

## (undated) DEVICE — YANKAUER FLEX NO VENT REG CAP

## (undated) DEVICE — SOL NACL IRR 3000ML

## (undated) DEVICE — GLOVE SIGNATURE MICRO LTX 8

## (undated) DEVICE — CAUTERY TIP POLISHER

## (undated) DEVICE — BLADE SURG STAINLESS STEEL #11

## (undated) DEVICE — TAPE MEDIPORE 3 X 10YD

## (undated) DEVICE — SUT VICRYL CTD 2-0 GI 27 SH

## (undated) DEVICE — CANNULA CRYTSAL PT 5.75MMX7CM

## (undated) DEVICE — BLADE GREAT WHITE 4.2 6/BX

## (undated) DEVICE — GLOVE SIGNATURE ESSNTL LTX 8

## (undated) DEVICE — SPONGE LAP 18X18 PREWASHED

## (undated) DEVICE — DRAPE U STD FILM LG 60X84IN

## (undated) DEVICE — PACK FLUID CONTROL SHOULDER

## (undated) DEVICE — GLOVE SENSICARE PI GRN 8

## (undated) DEVICE — POSITIONER HEAD SUPINE PINK

## (undated) DEVICE — PENCIL ELECSURG ROCKER 15FT

## (undated) DEVICE — BIT DRILL ANCHOR 2.9

## (undated) DEVICE — ELECTRODE PATIENT RETURN DISP

## (undated) DEVICE — DRAPE ORTH SPLIT 77X108IN

## (undated) DEVICE — PAD ABDOMINAL STERILE 8X10IN

## (undated) DEVICE — KIT SURGICAL TURNOVER

## (undated) DEVICE — SYR 50CC LL

## (undated) DEVICE — Device

## (undated) DEVICE — CONNECTOR TUBING STR 5 IN 1

## (undated) DEVICE — TOWEL OR DISP STRL BLUE 4/PK

## (undated) DEVICE — TUBE SET INFLOW/OUTFLOW

## (undated) DEVICE — DRESSING GAUZE XEROFORM 5X9

## (undated) DEVICE — DRAPE STERI U-SHAPED 47X51IN

## (undated) DEVICE — SYR DISP LL 5CC

## (undated) DEVICE — SUPPORT SLING SHOT II MEDIUM

## (undated) DEVICE — GLOVE SIGNATURE ESSNTL LTX 7.5

## (undated) DEVICE — GOWN POLY REINF BRTH SLV XL

## (undated) DEVICE — NDL MAGELLAN SAFETY 18G 1.5IN

## (undated) DEVICE — PROBE ARTHO ENERGY 90 DEG

## (undated) DEVICE — KIT ARTHROSCOPY SHOULDER

## (undated) DEVICE — SLING SHOT II LARGE

## (undated) DEVICE — KIT BASIC ORTHO UNIVERSITY

## (undated) DEVICE — SUT CTD VICRYL 2.0